# Patient Record
Sex: FEMALE | Race: WHITE | Employment: STUDENT | URBAN - NONMETROPOLITAN AREA
[De-identification: names, ages, dates, MRNs, and addresses within clinical notes are randomized per-mention and may not be internally consistent; named-entity substitution may affect disease eponyms.]

---

## 2021-01-11 ENCOUNTER — HOSPITAL ENCOUNTER (OUTPATIENT)
Age: 21
Setting detail: SPECIMEN
Discharge: HOME | End: 2021-01-11
Payer: COMMERCIAL

## 2021-01-11 PROCEDURE — U0003 INFECTIOUS AGENT DETECTION BY NUCLEIC ACID (DNA OR RNA); SEVERE ACUTE RESPIRATORY SYNDROME CORONAVIRUS 2 (SARS-COV-2) (CORONAVIRUS DISEASE [COVID-19]), AMPLIFIED PROBE TECHNIQUE, MAKING USE OF HIGH THROUGHPUT TECHNOLOGIES AS DESCRIBED BY CMS-2020-01-R: HCPCS

## 2021-01-13 LAB — SARS-COV-2: NOT DETECTED

## 2021-04-29 ENCOUNTER — NURSE ONLY (OUTPATIENT)
Dept: LAB | Age: 21
End: 2021-04-29

## 2021-05-03 LAB
CHLAMYDIA TRACHOMATIS AMPLIFIED DET: NEGATIVE
NEISSERIA GONORRHOEAE BY AMP: NEGATIVE
SPECIMEN SOURCE: NORMAL

## 2021-05-04 LAB
APTIMA MEDIA TYPE: NORMAL
T. VAGINALIS SPECIMEN SOURCE: NORMAL
TRICHOMONAS VAGINALIS BY NAA: NEGATIVE

## 2022-10-17 ENCOUNTER — HOSPITAL ENCOUNTER (EMERGENCY)
Age: 22
Discharge: HOME | End: 2022-10-17
Attending: EMERGENCY MEDICINE
Payer: COMMERCIAL

## 2022-10-17 VITALS
HEIGHT: 62 IN | RESPIRATION RATE: 16 BRPM | HEART RATE: 97 BPM | TEMPERATURE: 97 F | OXYGEN SATURATION: 99 % | DIASTOLIC BLOOD PRESSURE: 81 MMHG | BODY MASS INDEX: 28.56 KG/M2 | SYSTOLIC BLOOD PRESSURE: 127 MMHG | WEIGHT: 155.2 LBS

## 2022-10-17 DIAGNOSIS — F41.1 ANXIETY STATE: ICD-10-CM

## 2022-10-17 DIAGNOSIS — J06.9 VIRAL URI WITH COUGH: Primary | ICD-10-CM

## 2022-10-17 DIAGNOSIS — R00.0 TACHYCARDIA: ICD-10-CM

## 2022-10-17 LAB
AMORPHOUS: NORMAL
BACTERIA: NORMAL
BILIRUBIN URINE: NEGATIVE
BLOOD, URINE: NEGATIVE
CASTS UA: NORMAL /LPF
CHARACTER, URINE: CLEAR
COLOR: NORMAL
CRYSTALS, UA: NORMAL
EPITHELIAL CELLS, UA: NORMAL /HPF
FLU A ANTIGEN: NEGATIVE
FLU B ANTIGEN: NEGATIVE
GLUCOSE BLD-MCNC: 88 MG/DL (ref 70–108)
GLUCOSE, URINE: NEGATIVE MG/DL
KETONES, URINE: NEGATIVE
LEUKOCYTE ESTERASE, URINE: NEGATIVE
MUCUS: NORMAL
NITRITE, URINE: NEGATIVE
PH UA: 7 (ref 5–9)
PREGNANCY, URINE: NEGATIVE
PROTEIN UA: NEGATIVE MG/DL
RBC UA: NORMAL /HPF
REFLEX TO URINE C & S: NORMAL
SARS-COV-2, NAAT: NOT  DETECTED
SPECIFIC GRAVITY UA: 1.01 (ref 1–1.03)
UROBILINOGEN, URINE: 0.2 EU/DL (ref 0–1)
WBC UA: NORMAL /HPF

## 2022-10-17 PROCEDURE — 87635 SARS-COV-2 COVID-19 AMP PRB: CPT

## 2022-10-17 PROCEDURE — 81001 URINALYSIS AUTO W/SCOPE: CPT

## 2022-10-17 PROCEDURE — 99284 EMERGENCY DEPT VISIT MOD MDM: CPT

## 2022-10-17 PROCEDURE — 82948 REAGENT STRIP/BLOOD GLUCOSE: CPT

## 2022-10-17 PROCEDURE — 6370000000 HC RX 637 (ALT 250 FOR IP): Performed by: EMERGENCY MEDICINE

## 2022-10-17 PROCEDURE — 84703 CHORIONIC GONADOTROPIN ASSAY: CPT

## 2022-10-17 PROCEDURE — 87804 INFLUENZA ASSAY W/OPTIC: CPT

## 2022-10-17 PROCEDURE — 93005 ELECTROCARDIOGRAM TRACING: CPT | Performed by: EMERGENCY MEDICINE

## 2022-10-17 RX ORDER — ALPRAZOLAM 0.25 MG/1
0.5 TABLET ORAL ONCE
Status: COMPLETED | OUTPATIENT
Start: 2022-10-17 | End: 2022-10-17

## 2022-10-17 RX ADMIN — ALPRAZOLAM 0.5 MG: 0.25 TABLET ORAL at 21:17

## 2022-10-17 ASSESSMENT — ENCOUNTER SYMPTOMS
DIARRHEA: 0
COUGH: 1
EYE PAIN: 0
EYE DISCHARGE: 0
NAUSEA: 0
BLOOD IN STOOL: 0
VOMITING: 0
ABDOMINAL PAIN: 0
SORE THROAT: 0
SHORTNESS OF BREATH: 1
WHEEZING: 0

## 2022-10-17 ASSESSMENT — PAIN - FUNCTIONAL ASSESSMENT
PAIN_FUNCTIONAL_ASSESSMENT: NONE - DENIES PAIN

## 2022-10-17 ASSESSMENT — LIFESTYLE VARIABLES: HOW OFTEN DO YOU HAVE A DRINK CONTAINING ALCOHOL: NEVER

## 2022-10-18 LAB
EKG ATRIAL RATE: 116 BPM
EKG P AXIS: 63 DEGREES
EKG P-R INTERVAL: 120 MS
EKG Q-T INTERVAL: 330 MS
EKG QRS DURATION: 86 MS
EKG QTC CALCULATION (BAZETT): 458 MS
EKG R AXIS: 30 DEGREES
EKG T AXIS: 44 DEGREES
EKG VENTRICULAR RATE: 116 BPM

## 2022-10-18 PROCEDURE — 93010 ELECTROCARDIOGRAM REPORT: CPT | Performed by: INTERNAL MEDICINE

## 2022-10-18 NOTE — ED NOTES
Observed patient resp easy, warm and dry. Patient provided with reassurance.       Girma Middleton RN  10/17/22 0478

## 2022-10-18 NOTE — ED NOTES
Patient concerned it could be her sugar. FSBS preformed result of 88 reported to Dr. Caitlin Caputo. No orders received.      Gerry Kang RN  10/17/22 5007

## 2022-10-18 NOTE — ED TRIAGE NOTES
Patient reported, \"I started having drainage down my back of the throat last Thurs. I have been having irregular heart rate, shortness of breath, cough, and not feeling good. I work as a teacher, and with . I don't know if I am just anxious, I am a worrier. \" Observed patient resp easy, drinking water.

## 2022-10-18 NOTE — ED PROVIDER NOTES
[address]  [address] [facility] [address]  Phone: [phone]    eMERGENCY dEPARTMENT eNCOUnter           [address] [facility]       Chief Complaint   Patient presents with    Nasal Congestion    Cough    Shortness of Breath     Started last Thursday        Nurses Notes reviewed and I agree except as noted in the HPI. HISTORY OF PRESENT ILLNESS    Dilia Russell is a 25 y.o. female who presented via private vehicle with above-mentioned complaint. Symptoms started 4 days ago. She is complaining of nasal congestion, mild nonproductive cough and mild shortness of breath. She denies chest pain. She feels her heart was racing recently. She said that she has been very anxious due to stress at work. She denies drinking alcohol or using illicit drugs. She denies suicidal homicidal ideation. REVIEW OF SYSTEMS     Review of Systems   Constitutional:  Negative for chills and fever. HENT:  Positive for congestion. Negative for sore throat. Eyes:  Negative for pain and discharge. Respiratory:  Positive for cough and shortness of breath. Negative for wheezing. Cardiovascular:  Positive for palpitations. Negative for chest pain. Gastrointestinal:  Negative for abdominal pain, blood in stool, diarrhea, nausea and vomiting. Genitourinary:  Negative for dysuria and hematuria. Musculoskeletal:  Negative for neck pain and neck stiffness. Neurological:  Negative for seizures, syncope and headaches. Psychiatric/Behavioral:  Negative for confusion. The patient is nervous/anxious. PAST MEDICAL HISTORY    has no past medical history on file. SURGICAL HISTORY      has a past surgical history that includes Mouth surgery. CURRENT MEDICATIONS       Previous Medications    NONFORMULARY    Indications: birth control daily       ALLERGIES     has No Known Allergies. FAMILY HISTORY     has no family status information on file.     family history is not on file.    SOCIAL HISTORY      reports that she has never smoked. She has never used smokeless tobacco. She reports that she does not drink alcohol and does not use drugs. PHYSICAL EXAM     INITIAL VITALS:  height is 5' 2\" (1.575 m) and weight is 155 lb 3.2 oz (70.4 kg). Her oral temperature is 97 °F (36.1 °C). Her blood pressure is 127/81 and her pulse is 97. Her respiration is 16 and oxygen saturation is 99%. Physical Exam  Vitals and nursing note reviewed. Constitutional:       Appearance: She is well-developed. She is not ill-appearing. Comments: She looks mildly anxious   HENT:      Head: Atraumatic. Nose: Congestion present. Eyes:      Conjunctiva/sclera: Conjunctivae normal.      Pupils: Pupils are equal, round, and reactive to light. Neck:      Thyroid: No thyromegaly. Vascular: No JVD. Trachea: No tracheal deviation. Cardiovascular:      Rate and Rhythm: Regular rhythm. Tachycardia present. Heart sounds: No murmur heard. No friction rub. No gallop. Pulmonary:      Effort: Pulmonary effort is normal. No respiratory distress. Breath sounds: Normal breath sounds. Abdominal:      General: Bowel sounds are normal.      Palpations: Abdomen is soft. Tenderness: There is no abdominal tenderness. Musculoskeletal:         General: No swelling or tenderness. Cervical back: Neck supple. Left lower leg: No edema. Neurological:      Mental Status: She is alert. DIFFERENTIAL DIAGNOSIS:       DIAGNOSTIC RESULTS     EKG: Interpreted by me as sinus tachycardia, normal P waves, normal AL interval, heart rate 116. Normal QRS, no acute ST changes, normal axis        LABS:   Labs Reviewed   COVID-19, RAPID   RAPID INFLUENZA A/B ANTIGENS   PREGNANCY, URINE   URINALYSIS WITH REFLEX TO CULTURE   RAPID DRUG SCREEN, URINE   POCT GLUCOSE   COVID was negative, UA was negative.     EMERGENCY DEPARTMENT COURSE:   Vitals:    Vitals:    10/17/22 2019 10/17/22 2119 10/17/22 2150   BP: (!) 152/94  127/81   Pulse:  (!) 109 97   Resp: 14 14 16   Temp: 97 °F (36.1 °C)     TempSrc: Oral     SpO2: 98% 98% 99%   Weight: 155 lb 3.2 oz (70.4 kg)     Height: 5' 2\" (1.575 m)       Patient presented with upper respiratory symptoms and tachycardia. Her exam was unremarkable except for mild tachycardia. She has no sign of infection. She looked anxious but she did not appear ill. She did she has been under stress at school and at work recently. Her COVID and urinalysis were negative. She received a neck 0.5 mg p.o. Evaluation at 10 PM:  She was awake and alert she report improvement, heart rate was down. I discussed the diagnosis and treatment plan with her. She demonstrated understanding. FINAL IMPRESSION      1. Viral URI with cough    2. Anxiety state    3. Tachycardia          DISPOSITION/PLAN   Charge home in good condition.     PATIENT REFERRED TO:  [facility]  [address] [facility]  [phone]    In 2 days      DISCHARGE MEDICATIONS:  New Prescriptions    No medications on file       (Please note that portions of this note were completed with a voice recognition program.  Efforts were made to edit the dictations but occasionally words are mis-transcribed.)    MD Luis Fernando Justice MD  10/17/22 6275

## 2022-10-18 NOTE — ED NOTES
Observed patient resp easy, warm and dry. Patient stable for discharge instructions provided. Patient educated on relaxation, stress, anxiety, signs and symptoms, and follow up. Patient verbalized understanding, all questions answered. Appreciation of care verbalized. Observed patient steadily ambulate from the department after discharge with family.       [facility] Sanjay, RN  10/17/22 0302